# Patient Record
Sex: FEMALE | Race: ASIAN | Employment: FULL TIME
[De-identification: names, ages, dates, MRNs, and addresses within clinical notes are randomized per-mention and may not be internally consistent; named-entity substitution may affect disease eponyms.]

---

## 2023-09-03 ENCOUNTER — HOSPITAL ENCOUNTER (EMERGENCY)
Facility: HOSPITAL | Age: 43
Discharge: HOME | End: 2023-09-03
Attending: EMERGENCY MEDICINE
Payer: COMMERCIAL

## 2023-09-03 VITALS
SYSTOLIC BLOOD PRESSURE: 162 MMHG | HEIGHT: 64 IN | TEMPERATURE: 98.1 F | OXYGEN SATURATION: 99 % | DIASTOLIC BLOOD PRESSURE: 90 MMHG | WEIGHT: 150 LBS | HEART RATE: 72 BPM | BODY MASS INDEX: 25.61 KG/M2 | RESPIRATION RATE: 16 BRPM

## 2023-09-03 DIAGNOSIS — R42 LIGHTHEADEDNESS: Primary | ICD-10-CM

## 2023-09-03 LAB
ALBUMIN SERPL-MCNC: 4.1 G/DL (ref 3.5–5)
ALBUMIN/GLOB SERPL: 1 (ref 1.1–2.2)
ALP SERPL-CCNC: 71 U/L (ref 45–117)
ALT SERPL-CCNC: 26 U/L (ref 12–78)
ANION GAP SERPL CALC-SCNC: 10 MMOL/L (ref 5–15)
AST SERPL-CCNC: 22 U/L (ref 15–37)
BASOPHILS # BLD: 0.1 K/UL (ref 0–0.1)
BASOPHILS NFR BLD: 0 % (ref 0–1)
BILIRUB SERPL-MCNC: 0.4 MG/DL (ref 0.2–1)
BUN SERPL-MCNC: 8 MG/DL (ref 6–20)
BUN/CREAT SERPL: 12 (ref 12–20)
CALCIUM SERPL-MCNC: 9.4 MG/DL (ref 8.5–10.1)
CHLORIDE SERPL-SCNC: 102 MMOL/L (ref 97–108)
CO2 SERPL-SCNC: 25 MMOL/L (ref 21–32)
COMMENT:: NORMAL
CREAT SERPL-MCNC: 0.68 MG/DL (ref 0.55–1.02)
DIFFERENTIAL METHOD BLD: ABNORMAL
EOSINOPHIL # BLD: 0.1 K/UL (ref 0–0.4)
EOSINOPHIL NFR BLD: 1 % (ref 0–7)
ERYTHROCYTE [DISTWIDTH] IN BLOOD BY AUTOMATED COUNT: 12.8 % (ref 11.5–14.5)
GLOBULIN SER CALC-MCNC: 4.1 G/DL (ref 2–4)
GLUCOSE SERPL-MCNC: 155 MG/DL (ref 65–100)
HCT VFR BLD AUTO: 42.6 % (ref 35–47)
HGB BLD-MCNC: 14.3 G/DL (ref 11.5–16)
IMM GRANULOCYTES # BLD AUTO: 0.1 K/UL (ref 0–0.04)
IMM GRANULOCYTES NFR BLD AUTO: 1 % (ref 0–0.5)
LYMPHOCYTES # BLD: 1.8 K/UL (ref 0.8–3.5)
LYMPHOCYTES NFR BLD: 15 % (ref 12–49)
MCH RBC QN AUTO: 28.5 PG (ref 26–34)
MCHC RBC AUTO-ENTMCNC: 33.6 G/DL (ref 30–36.5)
MCV RBC AUTO: 85 FL (ref 80–99)
MONOCYTES # BLD: 0.9 K/UL (ref 0–1)
MONOCYTES NFR BLD: 7 % (ref 5–13)
NEUTS SEG # BLD: 8.9 K/UL (ref 1.8–8)
NEUTS SEG NFR BLD: 76 % (ref 32–75)
NRBC # BLD: 0 K/UL (ref 0–0.01)
NRBC BLD-RTO: 0 PER 100 WBC
PLATELET # BLD AUTO: 311 K/UL (ref 150–400)
PMV BLD AUTO: 10.2 FL (ref 8.9–12.9)
POTASSIUM SERPL-SCNC: 4 MMOL/L (ref 3.5–5.1)
PROT SERPL-MCNC: 8.2 G/DL (ref 6.4–8.2)
RBC # BLD AUTO: 5.01 M/UL (ref 3.8–5.2)
SODIUM SERPL-SCNC: 137 MMOL/L (ref 136–145)
SPECIMEN HOLD: NORMAL
WBC # BLD AUTO: 11.8 K/UL (ref 3.6–11)

## 2023-09-03 PROCEDURE — 99284 EMERGENCY DEPT VISIT MOD MDM: CPT

## 2023-09-03 PROCEDURE — 2580000003 HC RX 258: Performed by: EMERGENCY MEDICINE

## 2023-09-03 PROCEDURE — 85025 COMPLETE CBC W/AUTO DIFF WBC: CPT

## 2023-09-03 PROCEDURE — 80053 COMPREHEN METABOLIC PANEL: CPT

## 2023-09-03 PROCEDURE — 36415 COLL VENOUS BLD VENIPUNCTURE: CPT

## 2023-09-03 PROCEDURE — 93005 ELECTROCARDIOGRAM TRACING: CPT | Performed by: HOSPITALIST

## 2023-09-03 RX ORDER — 0.9 % SODIUM CHLORIDE 0.9 %
1000 INTRAVENOUS SOLUTION INTRAVENOUS ONCE
Status: COMPLETED | OUTPATIENT
Start: 2023-09-03 | End: 2023-09-03

## 2023-09-03 RX ADMIN — SODIUM CHLORIDE 1000 ML: 9 INJECTION, SOLUTION INTRAVENOUS at 14:37

## 2023-09-03 ASSESSMENT — LIFESTYLE VARIABLES
HOW MANY STANDARD DRINKS CONTAINING ALCOHOL DO YOU HAVE ON A TYPICAL DAY: PATIENT DOES NOT DRINK
HOW OFTEN DO YOU HAVE A DRINK CONTAINING ALCOHOL: NEVER

## 2023-09-03 ASSESSMENT — PAIN SCALES - GENERAL: PAINLEVEL_OUTOF10: 0

## 2023-09-03 NOTE — ED TRIAGE NOTES
Patient arrives with c/o elevated BP, nausea and lightheadedness. Patient reports she takes labetalol. Patient denies vomiting, headache, blurry vision, numbness, tingling, weakness to the face, arms or legs. Hx of diabetes and HTN.

## 2023-09-03 NOTE — ED NOTES
Patient left ED in no acute distress, alert and oriented x4. Patient was encourage to come back if symptoms get worse. Patient was provided with discharge instructions and prescriptions. All questions were answered. Patient left ambulatory.         Jessa Cooper RN  09/03/23 1600

## 2023-09-03 NOTE — ED NOTES
Patient given discharge papers and instructions by this RN and provider. Patient verbalized understanding and stated not having questions or concerns regarding her care. Patient ambulatory out of ED in no new acute distress.       Loma Mcburney, RN  09/03/23 2011

## 2023-09-04 LAB
EKG ATRIAL RATE: 90 BPM
EKG DIAGNOSIS: NORMAL
EKG P AXIS: 49 DEGREES
EKG P-R INTERVAL: 132 MS
EKG Q-T INTERVAL: 352 MS
EKG QRS DURATION: 74 MS
EKG QTC CALCULATION (BAZETT): 430 MS
EKG R AXIS: 32 DEGREES
EKG T AXIS: 41 DEGREES
EKG VENTRICULAR RATE: 90 BPM

## 2023-09-04 PROCEDURE — 93010 ELECTROCARDIOGRAM REPORT: CPT | Performed by: SPECIALIST

## 2024-01-17 ENCOUNTER — OFFICE VISIT (OUTPATIENT)
Age: 44
End: 2024-01-17

## 2024-01-17 VITALS
HEART RATE: 112 BPM | RESPIRATION RATE: 18 BRPM | BODY MASS INDEX: 24.62 KG/M2 | OXYGEN SATURATION: 98 % | SYSTOLIC BLOOD PRESSURE: 120 MMHG | DIASTOLIC BLOOD PRESSURE: 80 MMHG | WEIGHT: 147.8 LBS | TEMPERATURE: 98.7 F | HEIGHT: 65 IN

## 2024-01-17 DIAGNOSIS — J01.00 ACUTE NON-RECURRENT MAXILLARY SINUSITIS: Primary | ICD-10-CM

## 2024-01-17 DIAGNOSIS — J02.9 SORE THROAT: ICD-10-CM

## 2024-01-17 LAB
STREP PYOGENES DNA, POC: NEGATIVE
VALID INTERNAL CONTROL, POC: YES

## 2024-01-17 RX ORDER — AMOXICILLIN AND CLAVULANATE POTASSIUM 875; 125 MG/1; MG/1
1 TABLET, FILM COATED ORAL 2 TIMES DAILY
Qty: 14 TABLET | Refills: 0 | Status: SHIPPED | OUTPATIENT
Start: 2024-01-17 | End: 2024-01-24

## 2024-01-17 NOTE — PROGRESS NOTES
Darian Moran (:  1980) is a 44 y.o. female,Established patient, here for evaluation of the following chief complaint(s):  Otalgia ((R) ear pain, sore throat, congestion symptom onset x 3 days )      ASSESSMENT/PLAN:  Visit Diagnoses and Associated Orders       Acute non-recurrent maxillary sinusitis    -  Primary    amoxicillin-clavulanate (AUGMENTIN) 875-125 MG per tablet [18675]           Sore throat        AMB POC STREP GO A DIRECT, DNA PROBE [72233 CPT(R)]               Treat for a sinusitis involving maxillary sinuses. Also recommended continue use of Flonase, may use Coricidin for eustachian tube dysfunction and nasal stuffiness.     I have discussed the results of my assessment, diagnosis and treatment plan with the patient. The patient also understands that early in the process of an illness, an Urgent Care work-up can be falsely reassuring. Therefore, if symptoms change, worsen or do not resolve and remain persistent, they should return to Urgent Care or seek further evaluation in the ED for immediate assessment. Additionally, they should continue care with their Primary provider. No further questions remained and patient was discharged to home.      Follow up in 3 to 5  days if symptoms persist or if symptoms worsen.    SUBJECTIVE/OBJECTIVE:  HPI     44 y.o. female presents with symptoms of Right ear pain, sore throat and congestion x 3 days. Patient was seen in this Urgent Care on 23 for URI. States she is concerned, as her son tested positive for step throat. She states she feels there is liquid in her ear., improved and then worse again.  She has used Nyquil, Flonase and steam without relief. Feels head is fully congested.        Results for orders placed or performed in visit on 24   AMB POC STREP GO A DIRECT, DNA PROBE   Result Value Ref Range    Valid Internal Control, POC YES     Strep pyogenes DNA, POC Negative       Strep negative  Vitals:    24 0946 24 1029

## 2024-01-17 NOTE — PATIENT INSTRUCTIONS
You have been diagnosed with an Upper respiratory infection and sinusitis.Complete antibiotics.  It is important to monitor your fever and take Tylenol and/or ibuprofen to keep your fever down and reduce body aches. For nasal congestion, Flonase nasal spray may be used for nasal stuffiness. To dry up nasal secretions you may use Coricidin HBP, which is safe for people with hypertension, use    It is also important to maintain good hydration, drink at least 64 ounces of fluid, water, juice, gingerale and gatorade are good choices. Avoid drinks with caffeine as they do not hydrate. Monitor for good urine output.     If fever persists, you develop abdominal pain, vomiting or shortness of breath, or do not  improve, please call PCP or go to nearest ED.     Acute symptoms usually resolve in 48 to 72 hours. Other symptoms, such as cough and nasal stuffiness usually resolve in 7 to 10 days, but may last longer.    Thank you for coming in to the [facility] today. I hope you are feeling better soon!  And sinusitis.

## 2024-01-18 ASSESSMENT — ENCOUNTER SYMPTOMS
SORE THROAT: 1
SHORTNESS OF BREATH: 0
COUGH: 0
NAUSEA: 0
WHEEZING: 0
ABDOMINAL PAIN: 0
DIARRHEA: 0

## 2024-09-21 ENCOUNTER — OFFICE VISIT (OUTPATIENT)
Age: 44
End: 2024-09-21

## 2024-09-21 VITALS
HEART RATE: 100 BPM | TEMPERATURE: 98.8 F | HEIGHT: 65 IN | BODY MASS INDEX: 24.79 KG/M2 | OXYGEN SATURATION: 98 % | WEIGHT: 148.8 LBS | SYSTOLIC BLOOD PRESSURE: 163 MMHG | DIASTOLIC BLOOD PRESSURE: 95 MMHG

## 2024-09-21 DIAGNOSIS — H66.91 RIGHT OTITIS MEDIA, UNSPECIFIED OTITIS MEDIA TYPE: Primary | ICD-10-CM

## 2024-09-21 RX ORDER — DESOGESTREL AND ETHINYL ESTRADIOL 0.15-0.03
KIT ORAL
COMMUNITY
Start: 2024-09-05

## 2024-09-21 ASSESSMENT — ENCOUNTER SYMPTOMS
SHORTNESS OF BREATH: 0
SINUS PAIN: 1
NAUSEA: 0
COUGH: 0
VOMITING: 0
SINUS PRESSURE: 1
DIARRHEA: 0

## 2025-02-28 ENCOUNTER — OFFICE VISIT (OUTPATIENT)
Age: 45
End: 2025-02-28

## 2025-02-28 VITALS
TEMPERATURE: 98.3 F | HEART RATE: 106 BPM | WEIGHT: 141 LBS | BODY MASS INDEX: 23.46 KG/M2 | SYSTOLIC BLOOD PRESSURE: 132 MMHG | RESPIRATION RATE: 18 BRPM | DIASTOLIC BLOOD PRESSURE: 80 MMHG | OXYGEN SATURATION: 98 %

## 2025-02-28 DIAGNOSIS — J06.9 UPPER RESPIRATORY TRACT INFECTION, UNSPECIFIED TYPE: Primary | ICD-10-CM

## 2025-02-28 DIAGNOSIS — K12.0 CANKER SORE: ICD-10-CM

## 2025-02-28 DIAGNOSIS — J02.9 SORE THROAT: ICD-10-CM

## 2025-02-28 LAB — S PYO AG THROAT QL: NORMAL

## 2025-02-28 RX ORDER — LISINOPRIL 5 MG/1
5 TABLET ORAL
COMMUNITY
Start: 2025-01-26 | End: 2025-04-26

## 2025-02-28 NOTE — PATIENT INSTRUCTIONS
Thank you for visiting [facility] today.    Flonase (over the counter) nasal spray, once a day  Saline nasal sprays  OTC plain Mucinex to help thin secretions  May gargle with warm salt water for throat discomfort  Ibuprofen (400-800 mg) every 8 hours; Tylenol (325-500) mg every 6 hours   Zyrtec/Xyzal/Allegra/Claritin during the day or Benadryl at night may help with allergies.  You may use the decongestant version of these medications as well.  Simple foods like chicken noodle soup, smoothies, hot tea with lemon and honey may also help  Steam inhalation, humidifier, warm compresses  Increase oral fluids to maintain hydration  Avoid smoking and minimize contact with environmental irritants    Please follow up with your primary care provider if your symptoms last more than 10 days or worsen.    Please go immediately to the Emergency Department if you develop:  Fever higher than 102F (38.9C), sudden and severe pain in the face and head, trouble seeing or seeing double, trouble thinking clearly, swelling or redness around one or both eyes or a stiff neck

## 2025-02-28 NOTE — PROGRESS NOTES
Reports that she also has a canker sore on her tongue. Denies any fever or bodyaches.  No complaint of any nausea, vomiting, or abdominal pain.  Reports that her son had upper respiratory symptoms a few days ago.  Patient requesting strep test today.           Ear Pain (Pt presents with bilateral ear pain, sore throat, and sinus pressure x 2 days ) and Pharyngitis      Results for orders placed or performed in visit on 02/28/25   POCT rapid strep A   Result Value Ref Range    Strep A Ag None Detected None Detected     Physical Exam  Vitals and nursing note reviewed.   Constitutional:       Appearance: Normal appearance.   HENT:      Head: Normocephalic.      Right Ear: Tympanic membrane, ear canal and external ear normal.      Left Ear: Tympanic membrane, ear canal and external ear normal.      Nose: Congestion present. No rhinorrhea.      Mouth/Throat:      Mouth: Mucous membranes are moist.      Tongue: Lesions present.      Pharynx: Oropharynx is clear. Posterior oropharyngeal erythema present. No pharyngeal swelling, oropharyngeal exudate or uvula swelling.        Comments: No exudate noted to throat, small canker sore noted to tongue   Eyes:      Extraocular Movements: Extraocular movements intact.      Conjunctiva/sclera: Conjunctivae normal.      Pupils: Pupils are equal, round, and reactive to light.   Cardiovascular:      Rate and Rhythm: Regular rhythm. Tachycardia present.      Heart sounds: Normal heart sounds.   Pulmonary:      Effort: Pulmonary effort is normal. No respiratory distress.      Breath sounds: Normal breath sounds. No stridor. No wheezing, rhonchi or rales.   Abdominal:      General: Abdomen is flat. Bowel sounds are normal.      Palpations: Abdomen is soft.   Musculoskeletal:         General: Normal range of motion.      Cervical back: Normal range of motion.   Lymphadenopathy:      Cervical: No cervical adenopathy.   Skin:     General: Skin is warm and dry.   Neurological:      General: